# Patient Record
Sex: MALE | Race: BLACK OR AFRICAN AMERICAN | Employment: FULL TIME | URBAN - METROPOLITAN AREA
[De-identification: names, ages, dates, MRNs, and addresses within clinical notes are randomized per-mention and may not be internally consistent; named-entity substitution may affect disease eponyms.]

---

## 2021-11-22 ENCOUNTER — HOSPITAL ENCOUNTER (OUTPATIENT)
Age: 26
Discharge: HOME | End: 2021-11-22
Payer: COMMERCIAL

## 2021-11-22 VITALS
SYSTOLIC BLOOD PRESSURE: 131 MMHG | OXYGEN SATURATION: 100 % | TEMPERATURE: 98 F | DIASTOLIC BLOOD PRESSURE: 80 MMHG | HEART RATE: 68 BPM | RESPIRATION RATE: 18 BRPM

## 2021-11-22 DIAGNOSIS — Z20.822 CLOSE EXPOSURE TO COVID-19 VIRUS: ICD-10-CM

## 2021-11-22 DIAGNOSIS — Z20.822 ENCOUNTER FOR LABORATORY TESTING FOR COVID-19 VIRUS: Primary | ICD-10-CM

## 2021-11-22 PROCEDURE — 99212 OFFICE O/P EST SF 10 MIN: CPT | Performed by: NURSE PRACTITIONER

## 2021-11-22 PROCEDURE — U0002 COVID-19 LAB TEST NON-CDC: HCPCS | Performed by: NURSE PRACTITIONER

## 2021-11-23 NOTE — ED PROVIDER NOTES
Patient Seen in: [facility]      History   Patient presents with:  Testing    Stated Complaint: COVID TEST    Subjective:   49-year-old male presents to immediate care today for a Covid test.  Reports he was exposed to his boss who is Covid sounds: Normal breath sounds. Abdominal:      General: Abdomen is flat. Musculoskeletal:         General: Normal range of motion. Cervical back: Normal range of motion and neck supple. Skin:     General: Skin is warm.       Capillary Refill: Capi

## 2022-01-09 ENCOUNTER — HOSPITAL ENCOUNTER (EMERGENCY)
Facility: HOSPITAL | Age: 27
Discharge: HOME | End: 2022-01-09
Attending: EMERGENCY MEDICINE
Payer: COMMERCIAL

## 2022-01-09 VITALS
OXYGEN SATURATION: 97 % | RESPIRATION RATE: 18 BRPM | WEIGHT: 266 LBS | TEMPERATURE: 98 F | HEART RATE: 88 BPM | SYSTOLIC BLOOD PRESSURE: 137 MMHG | HEIGHT: 69 IN | DIASTOLIC BLOOD PRESSURE: 88 MMHG | BODY MASS INDEX: 39.4 KG/M2

## 2022-01-09 DIAGNOSIS — S01.81XA FACIAL LACERATION, INITIAL ENCOUNTER: Primary | ICD-10-CM

## 2022-01-09 DIAGNOSIS — S01.511A LIP LACERATION, INITIAL ENCOUNTER: ICD-10-CM

## 2022-01-09 PROCEDURE — 99283 EMERGENCY DEPT VISIT LOW MDM: CPT

## 2022-01-09 PROCEDURE — 12011 RPR F/E/E/N/L/M 2.5 CM/<: CPT

## 2022-01-09 PROCEDURE — 90471 IMMUNIZATION ADMIN: CPT

## 2022-01-09 RX ORDER — LIDOCAINE HYDROCHLORIDE AND EPINEPHRINE 20; 5 MG/ML; UG/ML
20 INJECTION, SOLUTION EPIDURAL; INFILTRATION; INTRACAUDAL; PERINEURAL ONCE
Status: COMPLETED | OUTPATIENT
Start: 2022-01-09 | End: 2022-01-09

## 2022-01-09 RX ORDER — CHLORHEXIDINE GLUCONATE 0.12 MG/ML
15 RINSE ORAL 2 TIMES DAILY
Qty: 210 ML | Refills: 0 | Status: SHIPPED | OUTPATIENT
Start: 2022-01-09 | End: 2022-01-16

## 2022-01-10 NOTE — ED PROVIDER NOTES
Patient Seen in: [facility] AND [facility] Emergency Department    History   Patient presents with:  Laceration/Abrasion    Stated Complaint: Facial Injury     HPI    33 yo M without PMH presenting for evaluation of left lower lip laceration as sustained from   cl II-XII grossly intact. Skin: Skin is warm. Psychiatric: Cooperative. Nursing note and vitals reviewed. ED Course   Labs Reviewed - No data to display  Laceration repair:  Verbal consent was obtained from the patient.   The  1.5cm laceration on th days.      Medications Prescribed:  Discharge Medication List as of 1/9/2022  6:56 PM    START taking these medications    mupirocin 2 % External Ointment  Apply 1 Application topically 3 (three) times daily for 14 days. , Print, Disp-30 g, R-0    chlorhexi

## 2022-01-10 NOTE — ED NOTES
Lac repaired per dr Marilyn Mon    Pt verbalized understanding of discharge and follow up instructions, along with prescriptions.  Denies additional questioning  Stable upon discharge

## 2025-04-01 ENCOUNTER — HOSPITAL ENCOUNTER (EMERGENCY)
Facility: HOSPITAL | Age: 30
Discharge: HOME | End: 2025-04-01
Attending: EMERGENCY MEDICINE
Payer: COMMERCIAL

## 2025-04-01 ENCOUNTER — APPOINTMENT (OUTPATIENT)
Dept: GENERAL RADIOLOGY | Facility: HOSPITAL | Age: 30
End: 2025-04-01
Attending: EMERGENCY MEDICINE
Payer: COMMERCIAL

## 2025-04-01 VITALS
RESPIRATION RATE: 18 BRPM | TEMPERATURE: 98 F | WEIGHT: 270 LBS | DIASTOLIC BLOOD PRESSURE: 91 MMHG | BODY MASS INDEX: 39.99 KG/M2 | OXYGEN SATURATION: 97 % | HEIGHT: 69 IN | SYSTOLIC BLOOD PRESSURE: 146 MMHG | HEART RATE: 90 BPM

## 2025-04-01 DIAGNOSIS — S76.312A HAMSTRING STRAIN, LEFT, INITIAL ENCOUNTER: Primary | ICD-10-CM

## 2025-04-01 PROCEDURE — 73552 X-RAY EXAM OF FEMUR 2/>: CPT | Performed by: EMERGENCY MEDICINE

## 2025-04-01 PROCEDURE — 99283 EMERGENCY DEPT VISIT LOW MDM: CPT

## 2025-04-01 PROCEDURE — 99284 EMERGENCY DEPT VISIT MOD MDM: CPT

## 2025-04-01 NOTE — ED INITIAL ASSESSMENT (HPI)
Injured L leg Sunday playing football, felt a \"pop\" while running, pain has persisted. Pain back of thigh, worse when bending leg. Distal sensation in tact, able to ambulate but has slight limp and pain.

## 2025-04-01 NOTE — DISCHARGE INSTRUCTIONS
Use Ace wrap as directed.  Take ibuprofen (up to 600 mg every 6 hours) or Tylenol as needed for pain.  Follow-up with orthopedics for further evaluation and possible physical therapy referral.  Return to the emergency department if increasing pain or other new symptoms develop.

## 2025-04-01 NOTE — ED PROVIDER NOTES
Patient Seen in: [facility] Emergency Department      History     Chief Complaint   Patient presents with    Leg or Foot Injury     Stated Complaint: Leg Pain    Subjective:   HPI      29-year-old male with no significant past medical history presents with complaints of left posterior thigh pain.  The patient was playing flag football 2 days ago and was sprinting when he felt a pop to the back of his left thigh.  He states the pain was severe at the onset and has eased somewhat yesterday and today.  He reports difficulty walking the day of the injury and is now able to walk with a limp.  He denies specific hip or knee pain.  He denies other injuries or areas of pain.  He took some ibuprofen yesterday with some relief.    Objective:     History reviewed. No pertinent past medical history.           History reviewed. No pertinent surgical history.             Social History     Socioeconomic History    Marital status: Single   Tobacco Use    Smoking status: Never    Smokeless tobacco: Never     Social Drivers of Health      Received from [facility]    Housing Stability                  Physical Exam     ED Triage Vitals [04/01/25 0938]   BP (!) 146/91   Pulse 90   Resp 18   Temp 98 °F (36.7 °C)   Temp src Temporal   SpO2 97 %   O2 Device        Current Vitals:   Vital Signs  BP: (!) 146/91  Pulse: 90  Resp: 18  Temp: 98 °F (36.7 °C)  Temp src: Temporal    Oxygen Therapy  SpO2: 97 %        Physical Exam  General Appearance: well appearing  Eyes: pupils equal and round no injection  Respiratory: chest is non tender, lungs are clear to auscultation  Cardiac: regular rate and rhythm  Musculoskeletal: No specific hip or knee tenderness noted to palpation.  The patient has mild swelling and moderate tenderness to the posterior aspect of his left thigh.  No erythema or warmth noted.  Patient is able to flex at the knee with some tenderness with this movement.  No leg tenderness noted.  Andres  test negative bilaterally.  Bilateral dorsalis pedis pulses intact and symmetric.  Neurological: Speech normal.  Motor and sensation is intact and symmetric to bilateral lower extremities.  Skin: no rashes or lesions    DIFFERENTIAL DIAGNOSIS: After history and physical exam differential diagnosis was considered for hamstring strain, avulsion injury, or other          ED Course   Labs Reviewed - No data to display              MDM      I reveiewed the femur x-ray and agree with the radiologist report that showed no avulsion or other fracture noted.  Suspect hamstring strain.  Will give an Ace wrap and discharge home with orthopedic follow-up.  He is advised to take ibuprofen or Tylenol for pain.  He is advised to return if worsening symptoms develop.        Medical Decision Making      Disposition and Plan     Clinical Impression:  1. Hamstring strain, left, initial encounter         Disposition:  Discharge  4/1/2025 11:30 am    Follow-up:  [address] [id number]  Follow up            Medications Prescribed:  There are no discharge medications for this patient.          Supplementary Documentation: